# Patient Record
Sex: MALE
[De-identification: names, ages, dates, MRNs, and addresses within clinical notes are randomized per-mention and may not be internally consistent; named-entity substitution may affect disease eponyms.]

---

## 2018-08-02 ENCOUNTER — HOSPITAL ENCOUNTER (EMERGENCY)
Dept: HOSPITAL 88 - ER | Age: 38
Discharge: OTHER INSTITUTION | End: 2018-08-02
Payer: COMMERCIAL

## 2018-08-02 VITALS — HEIGHT: 68 IN | BODY MASS INDEX: 28.79 KG/M2 | WEIGHT: 190 LBS

## 2018-08-02 DIAGNOSIS — R51: ICD-10-CM

## 2018-08-02 DIAGNOSIS — I61.9: ICD-10-CM

## 2018-08-02 DIAGNOSIS — R26.2: ICD-10-CM

## 2018-08-02 DIAGNOSIS — R53.1: Primary | ICD-10-CM

## 2018-08-02 LAB
ALBUMIN SERPL-MCNC: 3.8 G/DL (ref 3.5–5)
ALBUMIN/GLOB SERPL: 1 {RATIO} (ref 0.8–2)
ALP SERPL-CCNC: 67 IU/L (ref 40–150)
ALT SERPL-CCNC: 16 IU/L (ref 0–55)
ANION GAP SERPL CALC-SCNC: 12.4 MMOL/L (ref 8–16)
BASOPHILS # BLD AUTO: 0 10*3/UL (ref 0–0.1)
BASOPHILS NFR BLD AUTO: 0.3 % (ref 0–1)
BUN SERPL-MCNC: 18 MG/DL (ref 7–26)
BUN/CREAT SERPL: 13 (ref 6–25)
CALCIUM SERPL-MCNC: 9 MG/DL (ref 8.4–10.2)
CHLORIDE SERPL-SCNC: 108 MMOL/L (ref 98–107)
CO2 SERPL-SCNC: 24 MMOL/L (ref 22–29)
DEPRECATED APTT PLAS QN: 29.7 SECONDS (ref 23.8–35.5)
DEPRECATED INR PLAS: 1.03
DEPRECATED NEUTROPHILS # BLD AUTO: 4.2 10*3/UL (ref 2.1–6.9)
EGFRCR SERPLBLD CKD-EPI 2021: 57 ML/MIN (ref 60–?)
EOSINOPHIL # BLD AUTO: 0.4 10*3/UL (ref 0–0.4)
EOSINOPHIL NFR BLD AUTO: 4.3 % (ref 0–6)
ERYTHROCYTE [DISTWIDTH] IN CORD BLOOD: 13.3 % (ref 11.7–14.4)
GLOBULIN PLAS-MCNC: 3.9 G/DL (ref 2.3–3.5)
GLUCOSE SERPLBLD-MCNC: 112 MG/DL (ref 74–118)
HCT VFR BLD AUTO: 46 % (ref 38.2–49.6)
HGB BLD-MCNC: 15.7 G/DL (ref 14–18)
LYMPHOCYTES # BLD: 3.3 10*3/UL (ref 1–3.2)
LYMPHOCYTES NFR BLD AUTO: 38.6 % (ref 18–39.1)
MCH RBC QN AUTO: 30.3 PG (ref 28–32)
MCHC RBC AUTO-ENTMCNC: 34.1 G/DL (ref 31–35)
MCV RBC AUTO: 88.6 FL (ref 81–99)
MONOCYTES # BLD AUTO: 0.7 10*3/UL (ref 0.2–0.8)
MONOCYTES NFR BLD AUTO: 7.6 % (ref 4.4–11.3)
NEUTS SEG NFR BLD AUTO: 49 % (ref 38.7–80)
PLATELET # BLD AUTO: 184 X10E3/UL (ref 140–360)
POTASSIUM SERPL-SCNC: 3.4 MMOL/L (ref 3.5–5.1)
PROTHROMBIN TIME: 12.7 SECONDS (ref 11.9–14.5)
RBC # BLD AUTO: 5.19 X10E6/UL (ref 4.3–5.7)
SODIUM SERPL-SCNC: 141 MMOL/L (ref 136–145)

## 2018-08-02 PROCEDURE — 99284 EMERGENCY DEPT VISIT MOD MDM: CPT

## 2018-08-02 PROCEDURE — 70450 CT HEAD/BRAIN W/O DYE: CPT

## 2018-08-02 PROCEDURE — 86900 BLOOD TYPING SEROLOGIC ABO: CPT

## 2018-08-02 PROCEDURE — 85025 COMPLETE CBC W/AUTO DIFF WBC: CPT

## 2018-08-02 PROCEDURE — 36415 COLL VENOUS BLD VENIPUNCTURE: CPT

## 2018-08-02 PROCEDURE — 80053 COMPREHEN METABOLIC PANEL: CPT

## 2018-08-02 PROCEDURE — 85730 THROMBOPLASTIN TIME PARTIAL: CPT

## 2018-08-02 PROCEDURE — 86850 RBC ANTIBODY SCREEN: CPT

## 2018-08-02 PROCEDURE — 85610 PROTHROMBIN TIME: CPT

## 2018-08-02 NOTE — DIAGNOSTIC IMAGING REPORT
Examination: CT BRAIN WITHOUT CONTRAST



History:Right-sided weakness. Hypertensive emergency.

Comparison studies:None



Technique:

Axial images were obtained from the skull base to the vertex.

Coronal and sagittal images reconstructed from the axial data.

Intravenous contrast: None



Findings:



Scalp: No abnormalities.

Bones: No fractures, blastic or lytic lesions.



Brain sulci: Appropriate for age.

Ventricles: Intraventricular hemorrhage, as detailed below. No hydrocephalus.



Extra-axial space:

No abnormalities.



Parenchyma: 

There is a 2.3 x 1.9 x 1.8 cm (superoinferior x anteroposterior x transverse

dimensions) hemorrhage in the left lentiform nucleus with subependymal rupture

through the atrium of the left lateral ventricle and extension of the

hemorrhage through the left lateral ventricle, foramen of [place], third

ventricle, cerebral aqueduct and fourth ventricle.

No masses, or acute or chronic cortical based vascular insults..



Sellar/suprasellar region: No abnormalities.

Craniocervical junction: Patent foramen magnum. No Chiari one malformation.



Incidental findings: 

None.



Impression:

 

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

atrium of the left lateral ventricle and extensive involvement of hemorrhage in

the ventricular system, as detailed above.



Emergent neurosurgical consultation is recommended.



Dr. Sherin Barrett discussed findings and recommendations with Dr. Danielle Zee on 8/2/2018 at 1032 hours. 



Signed by: Dr. Sherin Barrett M.D. on 8/2/2018 10:34 AM